# Patient Record
Sex: FEMALE | Race: WHITE | NOT HISPANIC OR LATINO | Employment: UNEMPLOYED | URBAN - METROPOLITAN AREA
[De-identification: names, ages, dates, MRNs, and addresses within clinical notes are randomized per-mention and may not be internally consistent; named-entity substitution may affect disease eponyms.]

---

## 2023-02-22 LAB
ALANINE AMINOTRANSFERASE (SGPT) (U/L) IN SER/PLAS: 9 U/L (ref 7–45)
ALBUMIN (G/DL) IN SER/PLAS: 4.8 G/DL (ref 3.4–5)
ALKALINE PHOSPHATASE (U/L) IN SER/PLAS: 47 U/L (ref 33–110)
ANION GAP IN SER/PLAS: 13 MMOL/L (ref 10–20)
ASPARTATE AMINOTRANSFERASE (SGOT) (U/L) IN SER/PLAS: 21 U/L (ref 9–39)
BASOPHILS (10*3/UL) IN BLOOD BY AUTOMATED COUNT: 0.1 X10E9/L (ref 0–0.1)
BASOPHILS/100 LEUKOCYTES IN BLOOD BY AUTOMATED COUNT: 1.2 % (ref 0–2)
BILIRUBIN TOTAL (MG/DL) IN SER/PLAS: 0.6 MG/DL (ref 0–1.2)
C REACTIVE PROTEIN (MG/L) IN SER/PLAS: <0.1 MG/DL
CALCIDIOL (25 OH VITAMIN D3) (NG/ML) IN SER/PLAS: 29 NG/ML
CALCIUM (MG/DL) IN SER/PLAS: 9.7 MG/DL (ref 8.6–10.6)
CARBON DIOXIDE, TOTAL (MMOL/L) IN SER/PLAS: 28 MMOL/L (ref 21–32)
CHLORIDE (MMOL/L) IN SER/PLAS: 102 MMOL/L (ref 98–107)
CREATININE (MG/DL) IN SER/PLAS: 0.8 MG/DL (ref 0.5–1.05)
EOSINOPHILS (10*3/UL) IN BLOOD BY AUTOMATED COUNT: 0.13 X10E9/L (ref 0–0.7)
EOSINOPHILS/100 LEUKOCYTES IN BLOOD BY AUTOMATED COUNT: 1.5 % (ref 0–6)
ERYTHROCYTE DISTRIBUTION WIDTH (RATIO) BY AUTOMATED COUNT: 12.1 % (ref 11.5–14.5)
ERYTHROCYTE MEAN CORPUSCULAR HEMOGLOBIN CONCENTRATION (G/DL) BY AUTOMATED: 33.2 G/DL (ref 32–36)
ERYTHROCYTE MEAN CORPUSCULAR VOLUME (FL) BY AUTOMATED COUNT: 92 FL (ref 80–100)
ERYTHROCYTES (10*6/UL) IN BLOOD BY AUTOMATED COUNT: 4.39 X10E12/L (ref 4–5.2)
GFR FEMALE: >90 ML/MIN/1.73M2
GLUCOSE (MG/DL) IN SER/PLAS: 134 MG/DL (ref 74–99)
HEMATOCRIT (%) IN BLOOD BY AUTOMATED COUNT: 40.4 % (ref 36–46)
HEMOGLOBIN (G/DL) IN BLOOD: 13.4 G/DL (ref 12–16)
IMMATURE GRANULOCYTES/100 LEUKOCYTES IN BLOOD BY AUTOMATED COUNT: 0.1 % (ref 0–0.9)
LEUKOCYTES (10*3/UL) IN BLOOD BY AUTOMATED COUNT: 8.5 X10E9/L (ref 4.4–11.3)
LYMPHOCYTES (10*3/UL) IN BLOOD BY AUTOMATED COUNT: 2.63 X10E9/L (ref 1.2–4.8)
LYMPHOCYTES/100 LEUKOCYTES IN BLOOD BY AUTOMATED COUNT: 31 % (ref 13–44)
MONOCYTES (10*3/UL) IN BLOOD BY AUTOMATED COUNT: 0.45 X10E9/L (ref 0.1–1)
MONOCYTES/100 LEUKOCYTES IN BLOOD BY AUTOMATED COUNT: 5.3 % (ref 2–10)
NEUTROPHILS (10*3/UL) IN BLOOD BY AUTOMATED COUNT: 5.16 X10E9/L (ref 1.2–7.7)
NEUTROPHILS/100 LEUKOCYTES IN BLOOD BY AUTOMATED COUNT: 60.9 % (ref 40–80)
NRBC (PER 100 WBCS) BY AUTOMATED COUNT: 0 /100 WBC (ref 0–0)
PLATELETS (10*3/UL) IN BLOOD AUTOMATED COUNT: 330 X10E9/L (ref 150–450)
POTASSIUM (MMOL/L) IN SER/PLAS: 4 MMOL/L (ref 3.5–5.3)
PROTEIN TOTAL: 7.1 G/DL (ref 6.4–8.2)
SEDIMENTATION RATE, ERYTHROCYTE: 4 MM/H (ref 0–20)
SODIUM (MMOL/L) IN SER/PLAS: 139 MMOL/L (ref 136–145)
UREA NITROGEN (MG/DL) IN SER/PLAS: 10 MG/DL (ref 6–23)

## 2023-10-17 DIAGNOSIS — M06.9 RHEUMATOID ARTHRITIS, UNSPECIFIED (MULTI): ICD-10-CM

## 2023-10-17 RX ORDER — MELOXICAM 15 MG/1
15 TABLET ORAL DAILY PRN
Qty: 30 TABLET | OUTPATIENT
Start: 2023-10-17

## 2023-10-28 PROBLEM — F98.8 ATTENTION DEFICIT DISORDER (ADD) IN ADULT: Status: ACTIVE | Noted: 2023-10-28

## 2023-10-28 PROBLEM — F43.10 PTSD (POST-TRAUMATIC STRESS DISORDER): Status: ACTIVE | Noted: 2023-10-28

## 2023-10-28 PROBLEM — M85.80 LOW BONE MASS: Status: ACTIVE | Noted: 2023-10-28

## 2023-10-28 PROBLEM — M12.9 ARTHROPATHY: Status: ACTIVE | Noted: 2023-10-28

## 2023-10-28 PROBLEM — I73.00 RAYNAUD'S PHENOMENON: Status: ACTIVE | Noted: 2023-10-28

## 2023-10-28 PROBLEM — M25.50 POLYARTHRALGIA: Status: ACTIVE | Noted: 2023-10-28

## 2023-10-28 PROBLEM — J45.20 MILD INTERMITTENT ASTHMA (HHS-HCC): Status: ACTIVE | Noted: 2023-10-28

## 2023-10-28 PROBLEM — Z91.010 HISTORY OF ALLERGY TO PEANUTS: Status: ACTIVE | Noted: 2023-10-28

## 2023-10-28 PROBLEM — M06.9 RHEUMATOID ARTHRITIS (MULTI): Status: ACTIVE | Noted: 2023-10-28

## 2023-10-28 RX ORDER — EPINEPHRINE 0.3 MG/.3ML
INJECTION INTRAMUSCULAR
COMMUNITY
Start: 2020-09-02

## 2023-10-28 RX ORDER — TALC
6 POWDER (GRAM) TOPICAL NIGHTLY PRN
COMMUNITY
Start: 2020-09-02

## 2023-10-28 RX ORDER — EFINACONAZOLE 100 MG/ML
SOLUTION TOPICAL NIGHTLY
COMMUNITY
Start: 2023-06-23

## 2023-10-28 RX ORDER — MELOXICAM 15 MG/1
15 TABLET ORAL DAILY PRN
COMMUNITY
End: 2023-10-30 | Stop reason: SDUPTHER

## 2023-10-28 RX ORDER — HYDROXYCHLOROQUINE SULFATE 200 MG/1
1 TABLET, FILM COATED ORAL DAILY
COMMUNITY

## 2023-10-28 RX ORDER — DULOXETIN HYDROCHLORIDE 60 MG/1
60 CAPSULE, DELAYED RELEASE ORAL DAILY
COMMUNITY

## 2023-10-28 RX ORDER — BACLOFEN 10 MG/1
1-2 TABLET ORAL NIGHTLY PRN
COMMUNITY
Start: 2023-06-07

## 2023-10-28 RX ORDER — DULOXETIN HYDROCHLORIDE 30 MG/1
30 CAPSULE, DELAYED RELEASE ORAL DAILY
COMMUNITY
Start: 2023-08-28

## 2023-10-28 RX ORDER — ALPRAZOLAM 0.25 MG/1
0.25 TABLET ORAL DAILY PRN
COMMUNITY

## 2023-10-28 RX ORDER — ALBUTEROL SULFATE 90 UG/1
1-2 AEROSOL, METERED RESPIRATORY (INHALATION) AS NEEDED
COMMUNITY
Start: 2016-03-18

## 2023-10-28 RX ORDER — LISDEXAMFETAMINE DIMESYLATE 30 MG/1
30 CAPSULE ORAL DAILY
COMMUNITY

## 2023-10-28 RX ORDER — TRAZODONE HYDROCHLORIDE 50 MG/1
50 TABLET ORAL NIGHTLY PRN
COMMUNITY
Start: 2023-08-28

## 2023-10-28 RX ORDER — OXYCODONE HYDROCHLORIDE 5 MG/1
5 TABLET ORAL EVERY 6 HOURS PRN
COMMUNITY
Start: 2022-11-01

## 2023-10-30 ENCOUNTER — LAB (OUTPATIENT)
Dept: LAB | Facility: LAB | Age: 43
End: 2023-10-30
Payer: COMMERCIAL

## 2023-10-30 ENCOUNTER — OFFICE VISIT (OUTPATIENT)
Dept: RHEUMATOLOGY | Facility: CLINIC | Age: 43
End: 2023-10-30
Payer: COMMERCIAL

## 2023-10-30 ENCOUNTER — ANCILLARY PROCEDURE (OUTPATIENT)
Dept: RADIOLOGY | Facility: CLINIC | Age: 43
End: 2023-10-30
Payer: COMMERCIAL

## 2023-10-30 VITALS
SYSTOLIC BLOOD PRESSURE: 129 MMHG | DIASTOLIC BLOOD PRESSURE: 78 MMHG | HEIGHT: 66 IN | HEART RATE: 98 BPM | WEIGHT: 134 LBS | BODY MASS INDEX: 21.53 KG/M2

## 2023-10-30 DIAGNOSIS — M54.42 CHRONIC BILATERAL LOW BACK PAIN WITH BILATERAL SCIATICA: ICD-10-CM

## 2023-10-30 DIAGNOSIS — G89.29 CHRONIC BILATERAL LOW BACK PAIN WITH BILATERAL SCIATICA: ICD-10-CM

## 2023-10-30 DIAGNOSIS — M06.9 RHEUMATOID ARTHRITIS, INVOLVING UNSPECIFIED SITE, UNSPECIFIED WHETHER RHEUMATOID FACTOR PRESENT (MULTI): Primary | ICD-10-CM

## 2023-10-30 DIAGNOSIS — M06.9 RHEUMATOID ARTHRITIS, INVOLVING UNSPECIFIED SITE, UNSPECIFIED WHETHER RHEUMATOID FACTOR PRESENT (MULTI): ICD-10-CM

## 2023-10-30 DIAGNOSIS — M54.41 CHRONIC BILATERAL LOW BACK PAIN WITH BILATERAL SCIATICA: ICD-10-CM

## 2023-10-30 LAB
25(OH)D3 SERPL-MCNC: 40 NG/ML (ref 30–100)
ALBUMIN SERPL BCP-MCNC: 4.1 G/DL (ref 3.4–5)
ALP SERPL-CCNC: 56 U/L (ref 33–110)
ALT SERPL W P-5'-P-CCNC: 8 U/L (ref 7–45)
ANION GAP SERPL CALC-SCNC: 13 MMOL/L (ref 10–20)
AST SERPL W P-5'-P-CCNC: 17 U/L (ref 9–39)
BASOPHILS # BLD AUTO: 0.09 X10*3/UL (ref 0–0.1)
BASOPHILS NFR BLD AUTO: 1.5 %
BILIRUB SERPL-MCNC: 0.2 MG/DL (ref 0–1.2)
BUN SERPL-MCNC: 11 MG/DL (ref 6–23)
CALCIUM SERPL-MCNC: 9.3 MG/DL (ref 8.6–10.6)
CHLORIDE SERPL-SCNC: 102 MMOL/L (ref 98–107)
CO2 SERPL-SCNC: 28 MMOL/L (ref 21–32)
CREAT SERPL-MCNC: 0.85 MG/DL (ref 0.5–1.05)
CRP SERPL-MCNC: 0.31 MG/DL
EOSINOPHIL # BLD AUTO: 0.14 X10*3/UL (ref 0–0.7)
EOSINOPHIL NFR BLD AUTO: 2.4 %
ERYTHROCYTE [DISTWIDTH] IN BLOOD BY AUTOMATED COUNT: 11.7 % (ref 11.5–14.5)
ERYTHROCYTE [SEDIMENTATION RATE] IN BLOOD BY WESTERGREN METHOD: 9 MM/H (ref 0–20)
GFR SERPL CREATININE-BSD FRML MDRD: 87 ML/MIN/1.73M*2
GLUCOSE SERPL-MCNC: 105 MG/DL (ref 74–99)
HCT VFR BLD AUTO: 39.5 % (ref 36–46)
HGB BLD-MCNC: 12.8 G/DL (ref 12–16)
IMM GRANULOCYTES # BLD AUTO: 0.02 X10*3/UL (ref 0–0.7)
IMM GRANULOCYTES NFR BLD AUTO: 0.3 % (ref 0–0.9)
LYMPHOCYTES # BLD AUTO: 1.84 X10*3/UL (ref 1.2–4.8)
LYMPHOCYTES NFR BLD AUTO: 31.1 %
MCH RBC QN AUTO: 29.6 PG (ref 26–34)
MCHC RBC AUTO-ENTMCNC: 32.4 G/DL (ref 32–36)
MCV RBC AUTO: 91 FL (ref 80–100)
MONOCYTES # BLD AUTO: 0.42 X10*3/UL (ref 0.1–1)
MONOCYTES NFR BLD AUTO: 7.1 %
NEUTROPHILS # BLD AUTO: 3.4 X10*3/UL (ref 1.2–7.7)
NEUTROPHILS NFR BLD AUTO: 57.6 %
NRBC BLD-RTO: 0 /100 WBCS (ref 0–0)
PLATELET # BLD AUTO: 330 X10*3/UL (ref 150–450)
PMV BLD AUTO: 11.7 FL (ref 7.5–11.5)
POTASSIUM SERPL-SCNC: 4.2 MMOL/L (ref 3.5–5.3)
PROT SERPL-MCNC: 6.4 G/DL (ref 6.4–8.2)
RBC # BLD AUTO: 4.33 X10*6/UL (ref 4–5.2)
SODIUM SERPL-SCNC: 139 MMOL/L (ref 136–145)
WBC # BLD AUTO: 5.9 X10*3/UL (ref 4.4–11.3)

## 2023-10-30 PROCEDURE — 86140 C-REACTIVE PROTEIN: CPT

## 2023-10-30 PROCEDURE — 72202 X-RAY EXAM SI JOINTS 3/> VWS: CPT

## 2023-10-30 PROCEDURE — 72202 X-RAY EXAM SI JOINTS 3/> VWS: CPT | Performed by: RADIOLOGY

## 2023-10-30 PROCEDURE — 82306 VITAMIN D 25 HYDROXY: CPT

## 2023-10-30 PROCEDURE — 99214 OFFICE O/P EST MOD 30 MIN: CPT | Performed by: INTERNAL MEDICINE

## 2023-10-30 PROCEDURE — 81381 HLA I TYPING 1 ALLELE HR: CPT

## 2023-10-30 PROCEDURE — 36415 COLL VENOUS BLD VENIPUNCTURE: CPT

## 2023-10-30 PROCEDURE — 80053 COMPREHEN METABOLIC PANEL: CPT

## 2023-10-30 PROCEDURE — 85025 COMPLETE CBC W/AUTO DIFF WBC: CPT

## 2023-10-30 PROCEDURE — 1036F TOBACCO NON-USER: CPT | Performed by: INTERNAL MEDICINE

## 2023-10-30 PROCEDURE — 85652 RBC SED RATE AUTOMATED: CPT

## 2023-10-30 RX ORDER — SULFASALAZINE 500 MG/1
TABLET, DELAYED RELEASE ORAL
Qty: 120 TABLET | Refills: 1 | Status: SHIPPED | OUTPATIENT
Start: 2023-10-30 | End: 2024-01-25 | Stop reason: SDUPTHER

## 2023-10-30 RX ORDER — MELOXICAM 15 MG/1
15 TABLET ORAL DAILY PRN
Qty: 90 TABLET | Refills: 0 | Status: SHIPPED | OUTPATIENT
Start: 2023-10-30 | End: 2024-01-25 | Stop reason: SDUPTHER

## 2023-11-02 LAB — HLAB27 TYPING: NEGATIVE

## 2023-12-29 DIAGNOSIS — M06.9 RHEUMATOID ARTHRITIS, INVOLVING UNSPECIFIED SITE, UNSPECIFIED WHETHER RHEUMATOID FACTOR PRESENT (MULTI): ICD-10-CM

## 2024-01-02 ENCOUNTER — OFFICE VISIT (OUTPATIENT)
Dept: RHEUMATOLOGY | Facility: CLINIC | Age: 44
End: 2024-01-02
Payer: COMMERCIAL

## 2024-01-02 VITALS
WEIGHT: 140 LBS | SYSTOLIC BLOOD PRESSURE: 127 MMHG | BODY MASS INDEX: 22.5 KG/M2 | HEIGHT: 66 IN | DIASTOLIC BLOOD PRESSURE: 85 MMHG | HEART RATE: 96 BPM

## 2024-01-02 DIAGNOSIS — M06.9 RHEUMATOID ARTHRITIS, INVOLVING UNSPECIFIED SITE, UNSPECIFIED WHETHER RHEUMATOID FACTOR PRESENT (MULTI): Primary | ICD-10-CM

## 2024-01-02 PROCEDURE — 1036F TOBACCO NON-USER: CPT | Performed by: INTERNAL MEDICINE

## 2024-01-02 PROCEDURE — 99214 OFFICE O/P EST MOD 30 MIN: CPT | Performed by: INTERNAL MEDICINE

## 2024-01-02 RX ORDER — SULFASALAZINE 500 MG/1
TABLET, DELAYED RELEASE ORAL
Qty: 120 TABLET | Refills: 1 | OUTPATIENT
Start: 2024-01-02

## 2024-01-02 NOTE — PROGRESS NOTES
Follow-up Rheumatology Patient Visit    Chief Complaint:  Jessica Wylie is a 43 y.o. female presenting today for Follow-up.    History of Presenting Problem:   42 y/o female with rheumatoid arthritis with mild elevated CCP presents for evaluation. She had a recent Surgery for Right Femoral Hip impingement syndrome.   Overall she reports her peripheral joints are well managed on hydroxychloroquine every other day she could not tolerate more because of nausea and weight loss  She is dealing with chronic lower back pain, recently had a laminectomy,  bilateral Hip arthroscopy and a Discectomy. She is struggling with back pain and recently had a nerve block which seems to help..  She continues to have constant lower back pain, she is concerned that she may have ankylosing Spondylitis, She  reports stiffness that improves with exercise, meloxicam seems to help 40% of the time.  Today patient follows up with visit she feels that she has some slight improvement with taking sulfasalazine with her back pain although she continues to have chronic back pain.  She is frustrated as she has history of chronic pelvic pain also related to endometriosis despite hip surgery for impingement she continues to have pain.  She does not have any peripheral joint complaints at this visit          Problem List:   Patient Active Problem List   Diagnosis    Arthropathy    Attention deficit disorder (ADD) in adult    History of allergy to peanuts    Low bone mass    Mild intermittent asthma    Polyarthralgia    PTSD (post-traumatic stress disorder)    Raynaud's phenomenon    Rheumatoid arthritis (CMS/HCC)       Past Medical History:   Past Medical History:   Diagnosis Date    Contact with and (suspected) exposure to other viral communicable diseases 03/04/2019    Exposure to influenza    Personal history of other diseases of the female genital tract     History of endometriosis    Personal history of other diseases of the female genital tract      History of infertility    Personal history of other mental and behavioral disorders     History of depression    Personal history of other mental and behavioral disorders     History of anxiety       Surgical History:   Past Surgical History:   Procedure Laterality Date    OTHER SURGICAL HISTORY  12/10/2018     section        Allergies:   Allergies   Allergen Reactions    Nut - Unspecified Anaphylaxis    Tree Nuts Anaphylaxis       Medications:   Current Outpatient Medications:     albuterol 90 mcg/actuation inhaler, Inhale 1-2 puffs if needed. EVERY 4 TO 6 HOURS, Disp: , Rfl:     ALPRAZolam (Xanax) 0.25 mg tablet, Take 1 tablet (0.25 mg) by mouth once daily as needed for anxiety., Disp: , Rfl:     baclofen (Lioresal) 10 mg tablet, Take 1-2 tablets (10-20 mg) by mouth as needed at bedtime., Disp: , Rfl:     DULoxetine (Cymbalta) 30 mg DR capsule, Take 1 capsule (30 mg) by mouth once daily. (WITH 60 MG CAPSULE), Disp: , Rfl:     DULoxetine (Cymbalta) 60 mg DR capsule, Take 1 capsule (60 mg) by mouth once daily., Disp: , Rfl:     efinaconazole (Jublia) 10 % solution with applicator, Apply topically once daily at bedtime., Disp: , Rfl:     EPINEPHrine (EpiPen 2-Papi) 0.3 mg/0.3 mL injection syringe, EpiPen 2-Papi 0.3 MG/0.3ML Injection Solution Auto-injector Refills: MD Nanda Morales Start: 2-Sep-2020, Disp: , Rfl:     hydroxychloroquine (Plaquenil) 200 mg tablet, Take 1 tablet (200 mg) by mouth once daily., Disp: , Rfl:     melatonin 3 mg tablet, Take 2 tablets (6 mg) by mouth as needed at bedtime., Disp: , Rfl:     meloxicam (Mobic) 15 mg tablet, Take 1 tablet (15 mg) by mouth once daily as needed for moderate pain (4 - 6)., Disp: 90 tablet, Rfl: 0    oxyCODONE (Roxicodone) 5 mg immediate release tablet, Take 1 tablet (5 mg) by mouth every 6 hours if needed (FOR PAIN FOR POST SURGICAL PAIN)., Disp: , Rfl:     sulfaSALAzine (Azulfidine) 500 mg DR tablet, Start taking 1 pill twice a day for 7 days, and then  "wean up to taking 2 pills in the morning and then taking 1 pill in the evening for 7 days and then wean up to taking 2 pills twice a day, Disp: 120 tablet, Rfl: 1    traZODone (Desyrel) 50 mg tablet, Take 1 tablet (50 mg) by mouth as needed at bedtime (FOR INSOMNIA)., Disp: , Rfl:     Vyvanse 30 mg capsule, Take 1 capsule (30 mg) by mouth once daily., Disp: , Rfl:       Objective   Physical Examination:    Visit Vitals  /85   Pulse 96   Ht 1.664 m (5' 5.5\")   Wt 63.5 kg (140 lb)   BMI 22.94 kg/m²   Smoking Status Never   BSA 1.71 m²        Gen: NAD, A&O x 3  HEENT: clear sclera and conjunctiva,     Musculoskeletal:   Neck; WNL, full ROM  Shoulder: WNL, full ROM  Elbow:WNL, full ROM, no effusion noted  Wrist and fingers;no active synovitis noted, Full ROM in the Wrist , Good fist and   Knees:  No effusions or crepitation, full ROM.  Hips; WNL, full ROM, Negative Mark test  Ankle, Feet; WNL, full ROM    Skin: No rashes or lesions seen, no nail changes  Neuro: A&O x3, Normal Gait    Procedures :None    Orders:  No orders of the defined types were placed in this encounter.       Provider Impression:   Assessment/Plan   No diagnosis found.     44 y/o female with various joint concern present for evaluation. She is concern about her joint pain given Family Hx of RA in her family. Patient has recent work-up revealed mild elevated CCP, given persistent joint issues and sensitivity of CCP antibodies will diagnose her with early onset rheumatoid arthritis at this time this is in the background picture of her hypermobility which does cause chronic joint pain.  Current workup shows a negative HLA-B27 and negative sacroiliac joint x-rays   -Continue with  sulfasalazine 1000 mg BID.   risk and benefit of treatment was discussed.   -Continue with meloxicam 15 mg daily as needed  -Hx of Hypermobility , recommend strength building exercises.   -Her Bone density was normal, encourage calcium and vitamin D supplement to " maintain bone density  -Given chronic lower back pain and patient concern for spondyloarthropathy, will order MRI of the pelvis.  -Obtain routine labs  -F/u in 3 months

## 2024-01-11 DIAGNOSIS — M06.9 RHEUMATOID ARTHRITIS, INVOLVING UNSPECIFIED SITE, UNSPECIFIED WHETHER RHEUMATOID FACTOR PRESENT (MULTI): ICD-10-CM

## 2024-01-15 RX ORDER — SULFASALAZINE 500 MG/1
TABLET, DELAYED RELEASE ORAL
Qty: 120 TABLET | Refills: 1 | OUTPATIENT
Start: 2024-01-15

## 2024-01-22 ENCOUNTER — LAB (OUTPATIENT)
Dept: LAB | Facility: LAB | Age: 44
End: 2024-01-22
Payer: COMMERCIAL

## 2024-01-22 ENCOUNTER — ANCILLARY PROCEDURE (OUTPATIENT)
Dept: RADIOLOGY | Facility: CLINIC | Age: 44
End: 2024-01-22
Payer: COMMERCIAL

## 2024-01-22 DIAGNOSIS — M06.9 RHEUMATOID ARTHRITIS, INVOLVING UNSPECIFIED SITE, UNSPECIFIED WHETHER RHEUMATOID FACTOR PRESENT (MULTI): ICD-10-CM

## 2024-01-22 LAB
ALBUMIN SERPL BCP-MCNC: 4.6 G/DL (ref 3.4–5)
ALP SERPL-CCNC: 53 U/L (ref 33–110)
ALT SERPL W P-5'-P-CCNC: 6 U/L (ref 7–45)
ANION GAP SERPL CALC-SCNC: 13 MMOL/L (ref 10–20)
AST SERPL W P-5'-P-CCNC: 17 U/L (ref 9–39)
BASOPHILS # BLD AUTO: 0.06 X10*3/UL (ref 0–0.1)
BASOPHILS NFR BLD AUTO: 1 %
BILIRUB SERPL-MCNC: 0.4 MG/DL (ref 0–1.2)
BUN SERPL-MCNC: 9 MG/DL (ref 6–23)
CALCIUM SERPL-MCNC: 9.2 MG/DL (ref 8.6–10.6)
CHLORIDE SERPL-SCNC: 102 MMOL/L (ref 98–107)
CO2 SERPL-SCNC: 26 MMOL/L (ref 21–32)
CREAT SERPL-MCNC: 0.78 MG/DL (ref 0.5–1.05)
CRP SERPL-MCNC: 0.15 MG/DL
EGFRCR SERPLBLD CKD-EPI 2021: >90 ML/MIN/1.73M*2
EOSINOPHIL # BLD AUTO: 0.2 X10*3/UL (ref 0–0.7)
EOSINOPHIL NFR BLD AUTO: 3.5 %
ERYTHROCYTE [DISTWIDTH] IN BLOOD BY AUTOMATED COUNT: 12.8 % (ref 11.5–14.5)
ERYTHROCYTE [SEDIMENTATION RATE] IN BLOOD BY WESTERGREN METHOD: 4 MM/H (ref 0–20)
GLUCOSE SERPL-MCNC: 125 MG/DL (ref 74–99)
HCT VFR BLD AUTO: 39 % (ref 36–46)
HGB BLD-MCNC: 12.4 G/DL (ref 12–16)
IMM GRANULOCYTES # BLD AUTO: 0.03 X10*3/UL (ref 0–0.7)
IMM GRANULOCYTES NFR BLD AUTO: 0.5 % (ref 0–0.9)
LYMPHOCYTES # BLD AUTO: 1.78 X10*3/UL (ref 1.2–4.8)
LYMPHOCYTES NFR BLD AUTO: 30.8 %
MCH RBC QN AUTO: 30.1 PG (ref 26–34)
MCHC RBC AUTO-ENTMCNC: 31.8 G/DL (ref 32–36)
MCV RBC AUTO: 95 FL (ref 80–100)
MONOCYTES # BLD AUTO: 0.39 X10*3/UL (ref 0.1–1)
MONOCYTES NFR BLD AUTO: 6.8 %
NEUTROPHILS # BLD AUTO: 3.31 X10*3/UL (ref 1.2–7.7)
NEUTROPHILS NFR BLD AUTO: 57.4 %
NRBC BLD-RTO: 0 /100 WBCS (ref 0–0)
PLATELET # BLD AUTO: 371 X10*3/UL (ref 150–450)
POTASSIUM SERPL-SCNC: 4 MMOL/L (ref 3.5–5.3)
PROT SERPL-MCNC: 6.8 G/DL (ref 6.4–8.2)
RBC # BLD AUTO: 4.12 X10*6/UL (ref 4–5.2)
SODIUM SERPL-SCNC: 137 MMOL/L (ref 136–145)
WBC # BLD AUTO: 5.8 X10*3/UL (ref 4.4–11.3)

## 2024-01-22 PROCEDURE — 72195 MRI PELVIS W/O DYE: CPT

## 2024-01-22 PROCEDURE — 86140 C-REACTIVE PROTEIN: CPT

## 2024-01-22 PROCEDURE — 80053 COMPREHEN METABOLIC PANEL: CPT

## 2024-01-22 PROCEDURE — 72195 MRI PELVIS W/O DYE: CPT | Performed by: RADIOLOGY

## 2024-01-22 PROCEDURE — 85652 RBC SED RATE AUTOMATED: CPT

## 2024-01-22 PROCEDURE — 85025 COMPLETE CBC W/AUTO DIFF WBC: CPT

## 2024-01-23 DIAGNOSIS — M06.9 RHEUMATOID ARTHRITIS, INVOLVING UNSPECIFIED SITE, UNSPECIFIED WHETHER RHEUMATOID FACTOR PRESENT (MULTI): ICD-10-CM

## 2024-01-23 RX ORDER — SULFASALAZINE 500 MG/1
TABLET, DELAYED RELEASE ORAL
Qty: 120 TABLET | Refills: 1 | OUTPATIENT
Start: 2024-01-23

## 2024-01-24 DIAGNOSIS — M06.9 RHEUMATOID ARTHRITIS, INVOLVING UNSPECIFIED SITE, UNSPECIFIED WHETHER RHEUMATOID FACTOR PRESENT (MULTI): ICD-10-CM

## 2024-01-25 DIAGNOSIS — M06.9 RHEUMATOID ARTHRITIS, INVOLVING UNSPECIFIED SITE, UNSPECIFIED WHETHER RHEUMATOID FACTOR PRESENT (MULTI): ICD-10-CM

## 2024-01-25 RX ORDER — MELOXICAM 15 MG/1
15 TABLET ORAL DAILY PRN
Qty: 90 TABLET | Refills: 0 | Status: SHIPPED | OUTPATIENT
Start: 2024-01-25 | End: 2024-04-22

## 2024-01-25 RX ORDER — SULFASALAZINE 500 MG/1
1000 TABLET, DELAYED RELEASE ORAL 2 TIMES DAILY
Qty: 360 TABLET | Refills: 0 | Status: SHIPPED | OUTPATIENT
Start: 2024-01-25 | End: 2024-04-22

## 2024-01-25 RX ORDER — MELOXICAM 15 MG/1
15 TABLET ORAL DAILY PRN
Qty: 90 TABLET | Refills: 0 | OUTPATIENT
Start: 2024-01-25

## 2024-04-15 ENCOUNTER — APPOINTMENT (OUTPATIENT)
Dept: RHEUMATOLOGY | Facility: CLINIC | Age: 44
End: 2024-04-15
Payer: COMMERCIAL

## 2024-04-21 DIAGNOSIS — M06.9 RHEUMATOID ARTHRITIS, INVOLVING UNSPECIFIED SITE, UNSPECIFIED WHETHER RHEUMATOID FACTOR PRESENT (MULTI): ICD-10-CM

## 2024-04-22 RX ORDER — MELOXICAM 15 MG/1
15 TABLET ORAL DAILY PRN
Qty: 90 TABLET | Refills: 0 | Status: SHIPPED | OUTPATIENT
Start: 2024-04-22 | End: 2024-07-21

## 2024-04-22 RX ORDER — SULFASALAZINE 500 MG/1
1000 TABLET, DELAYED RELEASE ORAL 2 TIMES DAILY
Qty: 360 TABLET | Refills: 0 | Status: SHIPPED | OUTPATIENT
Start: 2024-04-22